# Patient Record
Sex: FEMALE | Race: WHITE | NOT HISPANIC OR LATINO | URBAN - METROPOLITAN AREA
[De-identification: names, ages, dates, MRNs, and addresses within clinical notes are randomized per-mention and may not be internally consistent; named-entity substitution may affect disease eponyms.]

---

## 2021-04-21 ENCOUNTER — EMERGENCY (EMERGENCY)
Facility: HOSPITAL | Age: 21
LOS: 1 days | Discharge: HOME | End: 2021-04-21
Attending: EMERGENCY MEDICINE | Admitting: EMERGENCY MEDICINE
Payer: COMMERCIAL

## 2021-04-21 VITALS
RESPIRATION RATE: 18 BRPM | SYSTOLIC BLOOD PRESSURE: 126 MMHG | HEART RATE: 77 BPM | OXYGEN SATURATION: 100 % | TEMPERATURE: 97 F | DIASTOLIC BLOOD PRESSURE: 73 MMHG

## 2021-04-21 VITALS
TEMPERATURE: 98 F | HEIGHT: 66 IN | HEART RATE: 98 BPM | SYSTOLIC BLOOD PRESSURE: 139 MMHG | RESPIRATION RATE: 17 BRPM | DIASTOLIC BLOOD PRESSURE: 83 MMHG | OXYGEN SATURATION: 96 % | WEIGHT: 210.1 LBS

## 2021-04-21 DIAGNOSIS — N83.201 UNSPECIFIED OVARIAN CYST, RIGHT SIDE: ICD-10-CM

## 2021-04-21 DIAGNOSIS — Z20.822 CONTACT WITH AND (SUSPECTED) EXPOSURE TO COVID-19: ICD-10-CM
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
## 2021-04-21 DIAGNOSIS — D72.829 ELEVATED WHITE BLOOD CELL COUNT, UNSPECIFIED: ICD-10-CM

## 2021-04-21 DIAGNOSIS — R10.31 RIGHT LOWER QUADRANT PAIN: ICD-10-CM

## 2021-04-21 DIAGNOSIS — T83.32XA DISPLACEMENT OF INTRAUTERINE CONTRACEPTIVE DEVICE, INITIAL ENCOUNTER: ICD-10-CM
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
## 2021-04-21 DIAGNOSIS — K52.9 NONINFECTIVE GASTROENTERITIS AND COLITIS, UNSPECIFIED: ICD-10-CM

## 2021-04-21 LAB
ALBUMIN SERPL ELPH-MCNC: 4.6 G/DL — SIGNIFICANT CHANGE UP (ref 3.3–5)
ALP SERPL-CCNC: 102 U/L — SIGNIFICANT CHANGE UP (ref 40–120)
ALT FLD-CCNC: 18 U/L — SIGNIFICANT CHANGE UP (ref 10–45)
ANION GAP SERPL CALC-SCNC: 13 MMOL/L — SIGNIFICANT CHANGE UP (ref 5–17)
APPEARANCE UR: CLEAR — SIGNIFICANT CHANGE UP
AST SERPL-CCNC: 17 U/L — SIGNIFICANT CHANGE UP (ref 10–40)
BASOPHILS # BLD AUTO: 0.03 K/UL — SIGNIFICANT CHANGE UP (ref 0–0.2)
BASOPHILS NFR BLD AUTO: 0.2 % — SIGNIFICANT CHANGE UP (ref 0–2)
BILIRUB SERPL-MCNC: 0.6 MG/DL — SIGNIFICANT CHANGE UP (ref 0.2–1.2)
BILIRUB UR-MCNC: NEGATIVE — SIGNIFICANT CHANGE UP
BLD GP AB SCN SERPL QL: NEGATIVE — SIGNIFICANT CHANGE UP
BUN SERPL-MCNC: 9 MG/DL — SIGNIFICANT CHANGE UP (ref 7–23)
CALCIUM SERPL-MCNC: 10 MG/DL — SIGNIFICANT CHANGE UP (ref 8.4–10.5)
CHLORIDE SERPL-SCNC: 98 MMOL/L — SIGNIFICANT CHANGE UP (ref 96–108)
CO2 SERPL-SCNC: 25 MMOL/L — SIGNIFICANT CHANGE UP (ref 22–31)
COLOR SPEC: YELLOW — SIGNIFICANT CHANGE UP
CREAT SERPL-MCNC: 0.69 MG/DL — SIGNIFICANT CHANGE UP (ref 0.5–1.3)
DIFF PNL FLD: NEGATIVE — SIGNIFICANT CHANGE UP
EOSINOPHIL # BLD AUTO: 0 K/UL — SIGNIFICANT CHANGE UP (ref 0–0.5)
EOSINOPHIL NFR BLD AUTO: 0 % — SIGNIFICANT CHANGE UP (ref 0–6)
GLUCOSE SERPL-MCNC: 131 MG/DL — HIGH (ref 70–99)
GLUCOSE UR QL: NEGATIVE — SIGNIFICANT CHANGE UP
HCG SERPL-ACNC: <0 MIU/ML — SIGNIFICANT CHANGE UP
HCT VFR BLD CALC: 44.1 % — SIGNIFICANT CHANGE UP (ref 34.5–45)
HGB BLD-MCNC: 14.4 G/DL — SIGNIFICANT CHANGE UP (ref 11.5–15.5)
IMM GRANULOCYTES NFR BLD AUTO: 0.3 % — SIGNIFICANT CHANGE UP (ref 0–1.5)
KETONES UR-MCNC: NEGATIVE — SIGNIFICANT CHANGE UP
LEUKOCYTE ESTERASE UR-ACNC: NEGATIVE — SIGNIFICANT CHANGE UP
LIDOCAIN IGE QN: 30 U/L — SIGNIFICANT CHANGE UP (ref 7–60)
LYMPHOCYTES # BLD AUTO: 0.91 K/UL — LOW (ref 1–3.3)
LYMPHOCYTES # BLD AUTO: 7.6 % — LOW (ref 13–44)
MCHC RBC-ENTMCNC: 26.4 PG — LOW (ref 27–34)
MCHC RBC-ENTMCNC: 32.7 GM/DL — SIGNIFICANT CHANGE UP (ref 32–36)
MCV RBC AUTO: 80.8 FL — SIGNIFICANT CHANGE UP (ref 80–100)
MONOCYTES # BLD AUTO: 0.43 K/UL — SIGNIFICANT CHANGE UP (ref 0–0.9)
MONOCYTES NFR BLD AUTO: 3.6 % — SIGNIFICANT CHANGE UP (ref 2–14)
NEUTROPHILS # BLD AUTO: 10.64 K/UL — HIGH (ref 1.8–7.4)
NEUTROPHILS NFR BLD AUTO: 88.3 % — HIGH (ref 43–77)
NITRITE UR-MCNC: NEGATIVE — SIGNIFICANT CHANGE UP
NRBC # BLD: 0 /100 WBCS — SIGNIFICANT CHANGE UP (ref 0–0)
PH UR: 8.5 — HIGH (ref 5–8)
PLATELET # BLD AUTO: 310 K/UL — SIGNIFICANT CHANGE UP (ref 150–400)
POTASSIUM SERPL-MCNC: 4.1 MMOL/L — SIGNIFICANT CHANGE UP (ref 3.5–5.3)
POTASSIUM SERPL-SCNC: 4.1 MMOL/L — SIGNIFICANT CHANGE UP (ref 3.5–5.3)
PROT SERPL-MCNC: 8.6 G/DL — HIGH (ref 6–8.3)
PROT UR-MCNC: NEGATIVE MG/DL — SIGNIFICANT CHANGE UP
RBC # BLD: 5.46 M/UL — HIGH (ref 3.8–5.2)
RBC # FLD: 13.5 % — SIGNIFICANT CHANGE UP (ref 10.3–14.5)
RH IG SCN BLD-IMP: NEGATIVE — SIGNIFICANT CHANGE UP
SARS-COV-2 RNA SPEC QL NAA+PROBE: SIGNIFICANT CHANGE UP
SODIUM SERPL-SCNC: 136 MMOL/L — SIGNIFICANT CHANGE UP (ref 135–145)
SP GR SPEC: 1.01 — SIGNIFICANT CHANGE UP (ref 1–1.03)
UROBILINOGEN FLD QL: 0.2 E.U./DL — SIGNIFICANT CHANGE UP
WBC # BLD: 12.05 K/UL — HIGH (ref 3.8–10.5)
WBC # FLD AUTO: 12.05 K/UL — HIGH (ref 3.8–10.5)

## 2021-04-21 PROCEDURE — 99284 EMERGENCY DEPT VISIT MOD MDM: CPT | Mod: 25

## 2021-04-21 PROCEDURE — 96375 TX/PRO/DX INJ NEW DRUG ADDON: CPT

## 2021-04-21 PROCEDURE — 96374 THER/PROPH/DIAG INJ IV PUSH: CPT | Mod: XU

## 2021-04-21 PROCEDURE — 87184 SC STD DISK METHOD PER PLATE: CPT

## 2021-04-21 PROCEDURE — 76856 US EXAM PELVIC COMPLETE: CPT

## 2021-04-21 PROCEDURE — 86901 BLOOD TYPING SEROLOGIC RH(D): CPT

## 2021-04-21 PROCEDURE — 86850 RBC ANTIBODY SCREEN: CPT

## 2021-04-21 PROCEDURE — 87086 URINE CULTURE/COLONY COUNT: CPT

## 2021-04-21 PROCEDURE — 86900 BLOOD TYPING SEROLOGIC ABO: CPT

## 2021-04-21 PROCEDURE — 99284 EMERGENCY DEPT VISIT MOD MDM: CPT

## 2021-04-21 PROCEDURE — 85025 COMPLETE CBC W/AUTO DIFF WBC: CPT

## 2021-04-21 PROCEDURE — 74177 CT ABD & PELVIS W/CONTRAST: CPT

## 2021-04-21 PROCEDURE — 80053 COMPREHEN METABOLIC PANEL: CPT

## 2021-04-21 PROCEDURE — 76856 US EXAM PELVIC COMPLETE: CPT | Mod: 26

## 2021-04-21 PROCEDURE — 76830 TRANSVAGINAL US NON-OB: CPT

## 2021-04-21 PROCEDURE — G1004: CPT

## 2021-04-21 PROCEDURE — 74177 CT ABD & PELVIS W/CONTRAST: CPT | Mod: 26,MG

## 2021-04-21 PROCEDURE — 87635 SARS-COV-2 COVID-19 AMP PRB: CPT

## 2021-04-21 PROCEDURE — 76830 TRANSVAGINAL US NON-OB: CPT | Mod: 26

## 2021-04-21 PROCEDURE — 84702 CHORIONIC GONADOTROPIN TEST: CPT

## 2021-04-21 PROCEDURE — 36415 COLL VENOUS BLD VENIPUNCTURE: CPT

## 2021-04-21 PROCEDURE — 81003 URINALYSIS AUTO W/O SCOPE: CPT

## 2021-04-21 PROCEDURE — 83690 ASSAY OF LIPASE: CPT

## 2021-04-21 RX ORDER — SODIUM CHLORIDE 9 MG/ML
1000 INJECTION INTRAMUSCULAR; INTRAVENOUS; SUBCUTANEOUS ONCE
Refills: 0 | Status: COMPLETED | OUTPATIENT
Start: 2021-04-21 | End: 2021-04-21

## 2021-04-21 RX ORDER — IOHEXOL 300 MG/ML
30 INJECTION, SOLUTION INTRAVENOUS ONCE
Refills: 0 | Status: COMPLETED | OUTPATIENT
Start: 2021-04-21 | End: 2021-04-21

## 2021-04-21 RX ORDER — FAMOTIDINE 10 MG/ML
20 INJECTION INTRAVENOUS ONCE
Refills: 0 | Status: COMPLETED | OUTPATIENT
Start: 2021-04-21 | End: 2021-04-21

## 2021-04-21 RX ORDER — ONDANSETRON 8 MG/1
4 TABLET, FILM COATED ORAL ONCE
Refills: 0 | Status: COMPLETED | OUTPATIENT
Start: 2021-04-21 | End: 2021-04-21

## 2021-04-21 RX ADMIN — IOHEXOL 30 MILLILITER(S): 300 INJECTION, SOLUTION INTRAVENOUS at 09:43

## 2021-04-21 RX ADMIN — FAMOTIDINE 20 MILLIGRAM(S): 10 INJECTION INTRAVENOUS at 09:43

## 2021-04-21 RX ADMIN — SODIUM CHLORIDE 2000 MILLILITER(S): 9 INJECTION INTRAMUSCULAR; INTRAVENOUS; SUBCUTANEOUS at 09:43

## 2021-04-21 RX ADMIN — SODIUM CHLORIDE 1000 MILLILITER(S): 9 INJECTION INTRAMUSCULAR; INTRAVENOUS; SUBCUTANEOUS at 10:30

## 2021-04-21 RX ADMIN — ONDANSETRON 4 MILLIGRAM(S): 8 TABLET, FILM COATED ORAL at 09:43

## 2021-04-21 NOTE — ED PROVIDER NOTE - CARE PROVIDERS DIRECT ADDRESSES
[email or web address]

## 2021-04-21 NOTE — ED ADULT TRIAGE NOTE - CHIEF COMPLAINT QUOTE
BIBA from home c/o abdominal pain, mid to lower radiating to R side. +nausea/vomiting, denies fevers.

## 2021-04-21 NOTE — CONSULT NOTE ADULT - ASSESSMENT
20F w/ hx of C section p/w RLQ abd pain, nausea and vomiting.    #Abdominal Pain, Nausea and Vomiting  Patient with mild leukocytosis w/ imaging notable for wall-thickening of the right colon and distal ileum w/ surrounding fat stranding.  She denies fam hx of IBD and normal bowel movement.  Suspect infectious process; however, can consider further colonoscopic finding if sx persist.  -Continue supportive management  -Obtain stool studies including c diff and stool pcr  -Obtain CRP and fecal calprotectin  -Consider nonurgent colonoscopy for further evaluation    Case d/w svc attg

## 2021-04-21 NOTE — ED PROVIDER NOTE - GENITOURINARY, MLM
No rash, no lesions, IUD strings and white plastic end /part of IUD visualized in the cervical oz, IUD removed w/o complications by using forceps, no bleeding, no uterine tenderness,

## 2021-04-21 NOTE — ED PROVIDER NOTE - CARE PLAN
Nurse for [facility] states dtr requested pt bp to be checked daily   States they faxed the bp results (at nurses station) and want you to review them    Principal Discharge DX:	Enterocolitis  Secondary Diagnosis:	Ovarian cyst  Secondary Diagnosis:	IUD complication

## 2021-04-21 NOTE — ED PROVIDER NOTE - OBJECTIVE STATEMENT
21 y/o female with PMHx of  8 months ago presents today BIBA for RLQ abdominal pain since last night. Pt states the pain started at 10:30 last night, began in the epigastric region and progressed to RLQ. The pain is 7/10, intermittent, and described as "crampy". Pt states she started vomiting at 1:30am and would vomit "every 30 minutes". Pt denies any diarrhea and her last BM was this morning. Pt is sexually active and had IUD inserted about 2 months ago. 19 y/o  female with PMHx of  8 months ago presents today BIBA for RLQ abdominal pain since last night. Pt states the pain started at 10:30pm last night, began in the epigastric region and progressed to RLQ. The pain is 7/10, intermittent, and described as "crampy". Pt states she started vomiting at 1:30am and would vomit "every 30 minutes". Pt denies any diarrhea and her last BM was this morning. Pt is sexually active and had IUD inserted about 2 months ago. Denies any fever, chills, diarrhea, chest pain, SOB, or lightheadedness.

## 2021-04-21 NOTE — ED PROVIDER NOTE - NSFOLLOWUPINSTRUCTIONS_ED_ALL_ED_FT
Take Tylenol or Ibuprofen for pain if needed and follow up with your GYN  GI doctors for further evaluation and treatment.  Your IUD was removed due to malposition. Please discussed with your GYN doctor other contraception methods.           Colitis       Colitis is inflammation of the colon. Colitis may last a short time (be acute), or it may last a long time (become chronic).      What are the causes?  This condition may be caused by:  •Viruses.      •Bacteria.      •Reaction to medicine.      •Certain autoimmune diseases such as Crohn's disease or ulcerative colitis.      •Radiation treatment.      •Decreased blood flow to the bowel (ischemia).        What are the signs or symptoms?  Symptoms of this condition include:  •Watery diarrhea.      •Passing bloody or tarry stool.      •Pain.      •Fever.      •Vomiting.      •Tiredness (fatigue).      •Weight loss.      •Bloating.      •Abdominal pain.      •Having fewer bowel movements than usual.      •A strong and sudden urge to have a bowel movement.      •Feeling like the bowel is not empty after a bowel movement.        How is this diagnosed?    This condition is diagnosed with a stool test or a blood test.  You may also have other tests, such as:  •X-rays.      •CT scan.      •Colonoscopy.      •Endoscopy.      •Biopsy.        How is this treated?  Treatment for this condition depends on the cause. The condition may be treated by:  •Resting the bowel. This involves not eating or drinking for a period of time.      •Fluids that are given through an IV.      •Medicine for pain and diarrhea.      •Antibiotic medicines.      •Cortisone medicines.      •Surgery.        Follow these instructions at home:      Eating and drinking      •Follow instructions from your health care provider about eating or drinking restrictions.      •Drink enough fluid to keep your urine pale yellow.      •Work with a dietitian to determine which foods cause your condition to flare up.      •Avoid foods that cause flare-ups.      •Eat a well-balanced diet.      General instructions     •If you were prescribed an antibiotic medicine, take it as told by your health care provider. Do not stop taking the antibiotic even if you start to feel better.      •Take over-the-counter and prescription medicines only as told by your health care provider.      •Keep all follow-up visits as told by your health care provider. This is important.        Contact a health care provider if:    •Your symptoms do not go away.      •You develop new symptoms.        Get help right away if you:    •Have a fever that does not go away with treatment.      •Develop chills.      •Have extreme weakness, fainting, or dehydration.      •Have repeated vomiting.      •Develop severe pain in your abdomen.      •Pass bloody or tarry stool.        Summary    •Colitis is inflammation of the colon. Colitis may last a short time (be acute), or it may last a long time (become chronic).      •Treatment for this condition depends on the cause and may include resting the bowel, taking medicines, or having surgery.      •If you were prescribed an antibiotic medicine, take it as told by your health care provider. Do not stop taking the antibiotic even if you start to feel better.      •Get help right away if you develop severe pain in your abdomen.      •Keep all follow-up visits as told by your health care provider. This is important.      This information is not intended to replace advice given to you by your health care provider. Make sure you discuss any questions you have with your health care provider.                                                                                                  Ovarian Cyst         An ovarian cyst is a fluid-filled sac that forms on an ovary. The ovaries are small organs that produce eggs in women. Various types of cysts can form on the ovaries. Some may cause symptoms and require treatment. Most ovarian cysts go away on their own, are not cancerous (are benign), and do not cause problems.  Common types of ovarian cysts include:•Functional (follicle) cysts.  •Occur during the menstrual cycle, and usually go away with the next menstrual cycle if you do not get pregnant.      •Usually cause no symptoms.      •Endometriomas.  •Are cysts that form from the tissue that lines the uterus (endometrium).      •Are sometimes called “chocolate cysts” because they become filled with blood that turns brown.      •Can cause pain in the lower abdomen during intercourse and during your period.      •Cystadenoma cysts.  •Develop from cells on the outside surface of the ovary.      •Can get very large and cause lower abdomen pain and pain with intercourse.      •Can cause severe pain if they twist or break open (rupture).      •Dermoid cysts.  •Are sometimes found in both ovaries.      •May contain different kinds of body tissue, such as skin, teeth, hair, or cartilage.      •Usually do not cause symptoms unless they get very big.      •Theca lutein cysts.  •Occur when too much of a certain hormone (human chorionic gonadotropin) is produced and overstimulates the ovaries to produce an egg.      •Are most common after having procedures used to assist with the conception of a baby (in vitro fertilization).          What are the causes?  Ovarian cysts may be caused by:  •Ovarian hyperstimulation syndrome. This is a condition that can develop from taking fertility medicines. It causes multiple large ovarian cysts to form.      •Polycystic ovarian syndrome (PCOS). This is a common hormonal disorder that can cause ovarian cysts, as well as problems with your period or fertility.        What increases the risk?  The following factors may make you more likely to develop ovarian cysts:  •Being overweight or obese.      •Taking fertility medicines.      •Taking certain forms of hormonal birth control.      •Smoking.        What are the signs or symptoms?  Many ovarian cysts do not cause symptoms. If symptoms are present, they may include:  •Pelvic pain or pressure.      •Pain in the lower abdomen.      •Pain during sex.      •Abdominal swelling.      •Abnormal menstrual periods.      •Increasing pain with menstrual periods.        How is this diagnosed?  These cysts are commonly found during a routine pelvic exam. You may have tests to find out more about the cyst, such as:  •Ultrasound.      •X-ray of the pelvis.      •CT scan.      •MRI.      •Blood tests.        How is this treated?    Many ovarian cysts go away on their own without treatment. Your health care provider may want to check your cyst regularly for 2–3 months to see if it changes. If you are in menopause, it is especially important to have your cyst monitored closely because menopausal women have a higher rate of ovarian cancer.  When treatment is needed, it may include:  •Medicines to help relieve pain.      •A procedure to drain the cyst (aspiration).      •Surgery to remove the whole cyst.      •Hormone treatment or birth control pills. These methods are sometimes used to help dissolve a cyst.        Follow these instructions at home:    •Take over-the-counter and prescription medicines only as told by your health care provider.      • Do not drive or use heavy machinery while taking prescription pain medicine.      •Get regular pelvic exams and Pap tests as often as told by your health care provider.      •Return to your normal activities as told by your health care provider. Ask your health care provider what activities are safe for you.      • Do not use any products that contain nicotine or tobacco, such as cigarettes and e-cigarettes. If you need help quitting, ask your health care provider.      •Keep all follow-up visits as told by your health care provider. This is important.        Contact a health care provider if:    •Your periods are late, irregular, or painful, or they stop.      •You have pelvic pain that does not go away.      •You have pressure on your bladder or trouble emptying your bladder completely.      •You have pain during sex.    •You have any of the following in your abdomen:  •A feeling of fullness.      •Pressure.      •Discomfort.      •Pain that does not go away.      •Swelling.        •You feel generally ill.      •You become constipated.      •You lose your appetite.      •You develop severe acne.      •You start to have more body hair and facial hair.      •You are gaining weight or losing weight without changing your exercise and eating habits.      •You think you may be pregnant.        Get help right away if:    •You have abdominal pain that is severe or gets worse.      •You cannot eat or drink without vomiting.      •You suddenly develop a fever.      •Your menstrual period is much heavier than usual.      This information is not intended to replace advice given to you by your health care provider. Make sure you discuss any questions you have with your health care provider.                    Log Out.      [employer]® CareNotes®     :  [facility]  	                       INTRAUTERINE DEVICE - General Information           Intrauterine Device    WHAT YOU NEED TO KNOW:    What is an intrauterine device (IUD)? An IUD is a type of birth control that is inserted into your uterus. It is a small, flexible piece of plastic with a string on the end. It is inserted and removed by your healthcare provider. IUDs prevent sperm from reaching or fertilizing an egg. IUDs also prevent a fertilized egg from attaching to the uterus and developing into a fetus.    Female Reproductive System         What are the most common types of IUDs? Your healthcare provider will recommend the type of IUD that is right for you. This is based on your age and if you have had a child. If you have not had a child, a smaller IUD will be used.    Types of IUDs     •A copper IUD slowly releases a small amount of copper into your uterus. This IUD can remain in place for up to 10 years.      •A hormone-releasing IUD slowly releases a small amount of progesterone into your uterus. Progesterone is a hormone that is made by your body to help control your periods. This IUD can remain in place for 3 to 5 years.      What are the advantages of an IUD?   •An IUD is 98% to 99% effective in preventing pregnancy.      •The IUD can be removed by your healthcare provider if you decide to have a baby. You may be able to get pregnant as soon as the IUD is removed.      •An IUD protects you from pregnancy right after it is inserted.      •You do not have to stop sexual activity to insert it. You do not have to remember to take your birth control pill.      •Copper IUDs are safer for some women than oral birth control pills. Examples include women who smoke or have a history of blood clots.      •Hormone-releasing IUDs may decrease certain health problems. Examples include bleeding and cramping that happen with your monthly period.      What are the disadvantages of an IUD?   •There is a small chance that you could get pregnant. Sometimes the IUD cannot be removed after you get pregnant. This increases your risk of a miscarriage or an ectopic pregnancy. Ectopic pregnancy is when the fertilized egg starts to grow somewhere other than your uterus.      •An IUD does not protect you from sexually transmitted infections.      •You may have cramps during the first weeks after you get the IUD.      •A copper IUD may cause your monthly period to be heavier or more painful. This is more common within the first 3 months after you get the IUD. You may need to have your IUD removed if your bleeding or pain becomes severe. You may have spotting between periods.      •There is a small risk of an infection within the first 20 days after the IUD is placed. Infection can lead to pelvic inflammatory disease. This can cause infertility.      •Your uterus may tear when the IUD is inserted. The IUD may slip part or all of the way out of your uterus.      How is the IUD inserted?   •The IUD is usually inserted during your monthly period. This may help decrease the amount of discomfort you have during the procedure. It also helps make sure that you are not pregnant. You will be asked to lie down and place your feet in stirrups. Your healthcare provider will gently insert a speculum into your vagina. This is the same tool used during a Pap smear. The speculum allows your healthcare provider to see inside your vagina to your cervix. The cervix is the opening of your uterus.      •Your healthcare provider will clean your cervix with an antiseptic solution to prevent infection. You may be given numbing medicine. A long plastic tube is gently passed through your cervix and into your uterus. This tube has the IUD inside of it. The IUD is pushed out of the tube and into your uterus. You may have cramps as the IUD is inserted. The tube is removed after the IUD is in place.      How can I make sure my IUD is still in place? An IUD has a string made of plastic thread. One to 2 inches of this string hangs into your vagina. You cannot see this string, and it should not cause problems when you have sex. Check your IUD string every 3 days for the first 3 months after it is inserted. After that, check the string after each monthly period. Do the following to check the placement of your IUD:   •Wash your hands with soap and warm water. Dry them with a clean towel.      •Bend your knees and squat low to the ground.      •Gently put your index finger inside your vagina. The cervix is at the top of the vagina and feels like the tip of your nose. Feel for the IUD string. Do not pull on the string. You should not be able to feel the firm plastic of the IUD itself.      •Wash your hands after you check your IUD string.      Where can I find more information?   •[facility] [place] [address]  Phone: 1-777.442.6481  Web Address: http://www.plannedparenthood.org        When should I seek immediate care?   •You have severe pain or bleeding during your period.      •You have a fever and severe abdominal pain.      When should I call my doctor?   •You think you are pregnant.      •The IUD has come out.      •You have bleeding from your vagina after you have sex, and it is not your period.      •You have pain during sex.      •You cannot feel the IUD string, the string feels longer, or you feel the plastic of the IUD itself.      •You have vaginal discharge that is green, yellow, or has a foul odor.      •You have questions or concerns about your condition or care.

## 2021-04-21 NOTE — ED PROVIDER NOTE - PATIENT PORTAL LINK FT
You can access the FollowMyHealth Patient Portal offered by [facility] by registering at the following website: [email or web address]. By joining [employer]’s FollowMyHealth portal, you will also be able to view your health information using other applications (apps) compatible with our system.

## 2021-04-21 NOTE — CONSULT NOTE ADULT - SUBJECTIVE AND OBJECTIVE BOX
HPI:  20F w/ hx of C section p/w RLQ abd pain, nausea and vomiting.  Patient reports awaking from sleep with crampy RLQ pain with nausea and vomiting.  As symptoms persisted, she decided to come to the ED.  She denies fever, chill, cp, sob, diarrhea, constipation, change in bm, hematochezia or melena.  Denies recent travel, change in medication, toxic habit or change in diet.  Denies fam hx of IBD or GI malignancy.  No prior endoscopy.      Allergies    No Known Allergies    Intolerances      Home Medications:    MEDICATIONS:  MEDICATIONS  (STANDING):    MEDICATIONS  (PRN):    PAST MEDICAL & SURGICAL HISTORY:  No pertinent past medical history    No significant past surgical history      FAMILY HISTORY:  Denies fam hx of IBD     SOCIAL HISTORY:  Tobacco: denies  Alcohol: denies  Illicit Drugs: denies    REVIEW OF SYSTEMS:  All other 10 review of systems is negative except as indicated in HPI    Vital Signs Last 24 Hrs  T(C): 36.3 (21 Apr 2021 12:40), Max: 36.8 (21 Apr 2021 09:02)  T(F): 97.3 (21 Apr 2021 12:40), Max: 98.2 (21 Apr 2021 09:02)  HR: 77 (21 Apr 2021 12:40) (77 - 98)  BP: 126/73 (21 Apr 2021 12:40) (126/73 - 139/83)  BP(mean): --  RR: 18 (21 Apr 2021 12:40) (17 - 18)  SpO2: 100% (21 Apr 2021 12:40) (96% - 100%)      PHYSICAL EXAM:    General: Well developed; well nourished; in no acute distress  Eyes: moist conjunctivae, sclerae anicteric  HENT: Moist mucous membranes, tongue midline  Neck: Trachea midline, supple  Lungs: Normal respiratory effort and no intercostal retractions  Cardiovascular: RRR, S1S2  Abdomen: Soft, non-tender non-distended; Normal bowel sounds; No rebound or guarding  Extremities: Normal range of motion, No clubbing, cyanosis or edema  Neurological: Alert and oriented x3, nonfocal exam  Skin: Warm and dry. No obvious rash    LABS:                        14.4   12.05 )-----------( 310      ( 21 Apr 2021 09:24 )             44.1     04-21    136  |  98  |  9   ----------------------------<  131<H>  4.1   |  25  |  0.69    Ca    10.0      21 Apr 2021 09:24    TPro  8.6<H>  /  Alb  4.6  /  TBili  0.6  /  DBili  x   /  AST  17  /  ALT  18  /  AlkPhos  102  04-21            RADIOLOGY & ADDITIONAL STUDIES:  reviewed

## 2021-04-21 NOTE — ED PROVIDER NOTE - CARE PROVIDER_API CALL
Medardo Gillette (MD)  Gastroenterology; Internal Medicine  [address] [place] [address]  Phone: [phone]  Fax: [phone]  Follow Up Time:

## 2021-04-21 NOTE — ED PROVIDER NOTE - CLINICAL SUMMARY MEDICAL DECISION MAKING FREE TEXT BOX
21 yo female with RLQ pain, n/v since last night, Pt afebrile, non-toxic appearing. on exam- tender to RLQ. will c heck labs, and abd CT to r/o AP.     ER course: no evidence of AP, localized wall thickening to the right distal colon, right ovarian cyst(4.4 cm), and malposition of IUD( appears to be within cervix). case discussed with GI fellow on call-> no abx recommended for possible colitis, since its unclear etiology. pt recommended to f/u with GI as out pt. pelvic exam done- no clinical findings to suspect PID, IUD easily removed .  Pt will see her GYN in a few days to discussed other options for contraception. returned precautions discussed. pt is comfortable for diswcharge.

## 2021-04-23 LAB
-  AMPICILLIN: SIGNIFICANT CHANGE UP
-  CLINDAMYCIN: SIGNIFICANT CHANGE UP
-  ERYTHROMYCIN: SIGNIFICANT CHANGE UP
-  LEVOFLOXACIN: SIGNIFICANT CHANGE UP
-  PENICILLIN: SIGNIFICANT CHANGE UP
-  VANCOMYCIN: SIGNIFICANT CHANGE UP
CULTURE RESULTS: SIGNIFICANT CHANGE UP
METHOD TYPE: SIGNIFICANT CHANGE UP
ORGANISM # SPEC MICROSCOPIC CNT: SIGNIFICANT CHANGE UP
ORGANISM # SPEC MICROSCOPIC CNT: SIGNIFICANT CHANGE UP
SPECIMEN SOURCE: SIGNIFICANT CHANGE UP

## 2021-05-07 PROBLEM — Z00.00 ENCOUNTER FOR PREVENTIVE HEALTH EXAMINATION: Status: ACTIVE | Noted: 2021-05-07

## 2024-09-04 NOTE — ED ADULT NURSE NOTE - RECENT EXPOSURE TO
Problem: At Risk for Falls  Goal: Patient does not fall  Outcome: Monitoring/Evaluating progress  Goal: Patient takes action to control fall-related risks  Outcome: Monitoring/Evaluating progress     Problem: At Risk for Injury Due to Fall  Goal: Patient does not fall  Outcome: Monitoring/Evaluating progress  Goal: Takes action to control condition specific risks  Outcome: Monitoring/Evaluating progress  Goal: Verbalizes understanding of fall-related injury personal risks  Description: Document education using the patient education activity  Outcome: Monitoring/Evaluating progress     Problem: Fluid Volume Excess  Goal: Fluid Volume Excess Symptoms Resolved  Description: Treatment often consists of oxygen and respiratory support with diuretic therapy at doses that exceed usual dose (typically doubled).  Monitor patient response to treatment.    Acute dyspnea should resolve quickly if dose is adequate and kidney function is adequate. Dyspnea/SOB should only be observed with Activity after effective treatment. Patient should be able to lie down comfortably, without SOB.  Outcome: Monitoring/Evaluating progress  Goal: Oxygenation is maintained (SpO2 greater than or equal to 90% or as ordered)  Outcome: Monitoring/Evaluating progress  Goal: Verbalizes understanding of FVE management plan  Description: Document on Patient Education Activity  Outcome: Monitoring/Evaluating progress      none known